# Patient Record
Sex: FEMALE | Race: ASIAN | NOT HISPANIC OR LATINO
[De-identification: names, ages, dates, MRNs, and addresses within clinical notes are randomized per-mention and may not be internally consistent; named-entity substitution may affect disease eponyms.]

---

## 2022-03-24 ENCOUNTER — APPOINTMENT (OUTPATIENT)
Dept: SURGERY | Facility: CLINIC | Age: 47
End: 2022-03-24
Payer: MEDICAID

## 2022-03-24 VITALS
WEIGHT: 147 LBS | BODY MASS INDEX: 23.07 KG/M2 | DIASTOLIC BLOOD PRESSURE: 71 MMHG | HEIGHT: 67 IN | HEART RATE: 86 BPM | OXYGEN SATURATION: 98 % | SYSTOLIC BLOOD PRESSURE: 103 MMHG

## 2022-03-24 VITALS — TEMPERATURE: 97 F

## 2022-03-24 DIAGNOSIS — R93.89 ABNORMAL FINDINGS ON DIAGNOSTIC IMAGING OF OTHER SPECIFIED BODY STRUCTURES: ICD-10-CM

## 2022-03-24 DIAGNOSIS — Z80.0 FAMILY HISTORY OF MALIGNANT NEOPLASM OF DIGESTIVE ORGANS: ICD-10-CM

## 2022-03-24 DIAGNOSIS — Z78.9 OTHER SPECIFIED HEALTH STATUS: ICD-10-CM

## 2022-03-24 PROBLEM — Z00.00 ENCOUNTER FOR PREVENTIVE HEALTH EXAMINATION: Status: ACTIVE | Noted: 2022-03-24

## 2022-03-24 PROCEDURE — 99203 OFFICE O/P NEW LOW 30 MIN: CPT

## 2022-03-24 NOTE — PHYSICAL EXAM
[Normal Breath Sounds] : Normal breath sounds [Normal Rate and Rhythm] : normal rate and rhythm [No Rash or Lesion] : No rash or lesion [Alert] : alert [Oriented to Person] : oriented to person [Oriented to Place] : oriented to place [Oriented to Time] : oriented to time [Calm] : calm [de-identified] : A/Ox3; NAD. appears comfortable [de-identified] : EOMI; sclera anicteric. Nasal mucosa pink, septum midline. Oral mucosa pink. Tongue midline, Pharynx without exudates. [de-identified] : No chest deformity, No asymmetry, Normal contours,No nodules, No masses, No axillary adenopathy, No nipple discharge,No tenderness\par  [de-identified] : abd is soft, NT/ND\par  [de-identified] : +ROM, no joint swelling

## 2022-03-24 NOTE — REASON FOR VISIT
[Consultation] : a consultation visit [FreeTextEntry1] : abnormal finding on imaging, right breast 9:00

## 2022-03-24 NOTE — DATA REVIEWED
[FreeTextEntry1] : Date of Exam: 03-\par  \par EXAM:  DIGITAL BILATERAL SCREENING MAMMOGRAM WITH CAD AND TOMOSYNTHESIS\par \par HISTORY:  The patient is 46 years old and is seen for a screening mammogram. There is no personal history of breast cancer. Family history of breast cancer: Maternal aunt. Surgical history: None.\par \par CLINICAL BREAST EXAMINATION:  The patient reports clinical breast exam was more than one year ago. \par \par TECHNIQUE:  The following views were obtained digitally: bilateral craniocaudal, bilateral mediolateral oblique. Low-dose full-field digital breast tomosynthesis examination was performed with tomosynthesis acquisitions and synthesized 2D reconstructed mammogram. Computer-aided detection (CAD) was utilized. \par \par COMPARISON:  The present examination has been compared to prior breast imaging studies dating back to 2020.\par \par FINDINGS:\par BREAST COMPOSITION:  The breasts are heterogeneously dense, which may obscure small masses.\par \par No architectural distortion or significant calcifications detected. Scattered morphologically benign appearing calcifications are present in both breasts. 1.3 cm nodule noted left breast 2:00 position 6 cm from the nipple. Spot compression views recommended. The density of the breast parenchyma limits the sensitivity of mammography.\par \par IMPRESSION:  1.3 cm nodule noted left breast 2:00 position 6 cm from the nipple. Spot compression views recommended. The density of the breast parenchyma limits the sensitivity of mammography. Bilateral breast sonography recommended for additional evaluation.\par \par FOLLOW-UP:  Additional imaging.\par ASSESSMENT:  BI-RADS Category 0:  Incomplete. Need additional imaging evaluation.\par \par \par Date of Exam: 03-\par EXAM: ULTRASOUND BREAST BILATERAL COMPLETE\par \par HISTORY: The patient is 46 years old and is seen for evaluation of a left 2:00 1.3 cm nodule. Family history of breast cancer: None.\par \par TECHNIQUE: A bilateral breast ultrasound was performed with complete evaluation of the four quadrants, retroareolar regions, and axillae.\par \par COMPARISON: None.\par \par FINDINGS:\par The right 3:00 position, 2 cm from the nipple, demonstrated a 4 x 2 x 4 mm complicated cyst. The right 9:00 position, 1 cm from the nipple, demonstrated a 3 x 5 x 2 mm irregular complicated cysts that is taller than it is wide. The right 10:00 position, 3 cm the nipple, demonstrated a 5 x 2 x 5 mm complicated cyst. The right 11:00 position, 3 cm the nipple, demonstrated a 6 x 3 x 7 mm cyst.\par \par The left 2:00 position, 6 cm the nipple, demonstrated a complicated cyst which measured 4 x 4 by 3 mm. The left 3:00 position, 3 cm from the nipple, demonstrated a 1.4 x 0.3 x 1.0 cm cyst. The left 7:00 position, 2 cm from the nipple, mistreated a 4 x 2 x 4 mm cyst. The left 11:00 position, 2 cm the nipple, demonstrated 2 adjacent cysts complicated cyst which measured 11 x 4 x 11 mm. \par \par There is no lymphadenopathy in the axillae.\par \par IMPRESSION: Irregular complicated cyst seen at the right 9:00 position for which ultrasound-guided core biopsy is recommended.\par \par The results were transmitted to the critical results team who will notify the referring physician a few days.\par \par FOLLOW-UP: Ultrasound guided biopsy.\par ASSESSMENT: BI-RADS Category 4:  Suspicious.

## 2022-03-24 NOTE — HISTORY OF PRESENT ILLNESS
[de-identified] : MELA YUNG is a 46 year old F who is referred to the office for consultation visit, she presents w the cc of having an abnormal finding on recent mammogram/breast US. Imaging findings c/w irregular complicated cyst seen at the right 9:00 position for which ultrasound-guided core biopsy is recommended, BI RADS cat 4. \par The patient denies any personal history of breast CA. Denies family history. \par Ms. YUNG denies breast pain, no palpable breast masses/lumps felt to either breast. She denies nipple discharge, no nipple retraction/inversion, or skin changes. Denies constitutional symptoms. No previous breast biopsies reported.\par \par Patient states she has not gone for the biopsy because she has a lot of anxiety about experiencing pain.

## 2022-03-24 NOTE — ASSESSMENT
Additional Area 3 Units: 0 [FreeTextEntry1] : Ms. YUNG is a 46 year y/o F who presents for consult visit S/P BL breast mammo and US, c/w suspicious finding seen of the right breast,  irregular complicated cyst seen at the right 9:00 position for which ultrasound-guided core biopsy is recommended, BI RADS cat 4. \par Patient has not gone for the biopsy and states she is very hesitant because she has a lot of anxiety and afraid of pain.

## 2022-03-24 NOTE — CONSULT LETTER
[Dear  ___] : Dear  [unfilled], [Consult Letter:] : I had the pleasure of evaluating your patient, [unfilled]. [Consult Closing:] : Thank you very much for allowing me to participate in the care of this patient.  If you have any questions, please do not hesitate to contact me. [Sincerely,] : Sincerely, [FreeTextEntry3] : Anuj Kamara MD\par

## 2022-03-24 NOTE — PLAN
[FreeTextEntry1] : recommended sono guided/core biopsy of suspicious finding of the right breast , patient states she does not want to go for the biopsy\par also discussed the option for FNA of suspicious lesion , patient states she is not interested in having that done either and is refusing to have the biopsy done despite medical advice \par \par she has extreme anxiety and asking for a way to avoid biopsy /procedure \par \par FNA of the Right Breast Ordered; F/U after the study \par \par All patient's questions and concerns addressed to their satisfaction. \par \par

## 2022-09-26 ENCOUNTER — APPOINTMENT (OUTPATIENT)
Dept: UROLOGY | Facility: CLINIC | Age: 47
End: 2022-09-26

## 2022-09-26 VITALS
DIASTOLIC BLOOD PRESSURE: 66 MMHG | TEMPERATURE: 97.2 F | BODY MASS INDEX: 21.97 KG/M2 | HEIGHT: 67 IN | WEIGHT: 140 LBS | HEART RATE: 84 BPM | SYSTOLIC BLOOD PRESSURE: 99 MMHG

## 2022-09-26 DIAGNOSIS — R39.9 UNSPECIFIED SYMPTOMS AND SIGNS INVOLVING THE GENITOURINARY SYSTEM: ICD-10-CM

## 2022-09-26 DIAGNOSIS — Z78.9 OTHER SPECIFIED HEALTH STATUS: ICD-10-CM

## 2022-09-26 PROCEDURE — 99204 OFFICE O/P NEW MOD 45 MIN: CPT

## 2022-09-28 NOTE — ASSESSMENT
[FreeTextEntry1] : 46 yo F with LUTS\par \par - PVR = 0ml\par - UA, culture\par - Discussed possible etiologies for LUTS. Discussed ways to manage them including behavioral modifications such as adequate hydration, controlling constipation, restricting fluids in the evening\par - Discussed pros and cons of secondline therapy such as anticholinergic. Oxybutynin rx transmitted and side effect profile reviewed

## 2022-09-28 NOTE — HISTORY OF PRESENT ILLNESS
[FreeTextEntry1] : 48 yo F presents with history of LUTS for the last year\par Voiding every 1-2 hours, nocturia 1-2/night\par Urgency, exacerbated by cold weather\par Drinks 2-3 bottles of water, 1 liter of herbal or green tea\par no history UTI, no dysuria, no gross hematuria\par no incontinence\par no children\par normal bowel movements\par last saw gyn a month ago\par sexually active with no issues\par LMP = 8/30/22

## 2022-09-29 LAB
APPEARANCE: CLEAR
BACTERIA UR CULT: NORMAL
BACTERIA: ABNORMAL
BILIRUBIN URINE: NEGATIVE
BLOOD URINE: NEGATIVE
COLOR: NORMAL
GLUCOSE QUALITATIVE U: NEGATIVE
HYALINE CASTS: 3 /LPF
KETONES URINE: NEGATIVE
LEUKOCYTE ESTERASE URINE: NEGATIVE
MICROSCOPIC-UA: NORMAL
NITRITE URINE: NEGATIVE
PH URINE: 7
PROTEIN URINE: NEGATIVE
RED BLOOD CELLS URINE: 3 /HPF
SPECIFIC GRAVITY URINE: 1.01
SQUAMOUS EPITHELIAL CELLS: 3 /HPF
UROBILINOGEN URINE: NORMAL
WHITE BLOOD CELLS URINE: 2 /HPF

## 2023-01-11 ENCOUNTER — RX RENEWAL (OUTPATIENT)
Age: 48
End: 2023-01-11

## 2023-01-12 ENCOUNTER — RX RENEWAL (OUTPATIENT)
Age: 48
End: 2023-01-12

## 2023-02-14 ENCOUNTER — RX RENEWAL (OUTPATIENT)
Age: 48
End: 2023-02-14

## 2023-02-14 RX ORDER — OXYBUTYNIN CHLORIDE 5 MG/1
5 TABLET, EXTENDED RELEASE ORAL
Qty: 30 | Refills: 0 | Status: ACTIVE | COMMUNITY
Start: 2022-09-26 | End: 1900-01-01